# Patient Record
Sex: FEMALE | Race: WHITE
[De-identification: names, ages, dates, MRNs, and addresses within clinical notes are randomized per-mention and may not be internally consistent; named-entity substitution may affect disease eponyms.]

---

## 2020-03-06 ENCOUNTER — HOSPITAL ENCOUNTER (EMERGENCY)
Dept: HOSPITAL 74 - JERFT | Age: 30
Discharge: HOME | End: 2020-03-06
Payer: COMMERCIAL

## 2020-03-06 VITALS — BODY MASS INDEX: 25.9 KG/M2

## 2020-03-06 VITALS — HEART RATE: 107 BPM | SYSTOLIC BLOOD PRESSURE: 131 MMHG | DIASTOLIC BLOOD PRESSURE: 85 MMHG

## 2020-03-06 VITALS — TEMPERATURE: 97.2 F

## 2020-03-06 DIAGNOSIS — J06.9: Primary | ICD-10-CM

## 2020-03-06 DIAGNOSIS — B97.89: ICD-10-CM

## 2020-03-06 NOTE — PDOC
History of Present Illness





- General


Chief Complaint: Cold Symptoms


Stated Complaint: ALLERGIES/ FEVER


Time Seen by Provider: 03/06/20 08:23


History Source: Patient


Exam Limitations: No Limitations





Past History





- Past Medical History


Allergies/Adverse Reactions: 


                                    Allergies











Allergy/AdvReac Type Severity Reaction Status Date / Time


 


No Known Allergies Allergy   Verified 03/06/20 08:17











Home Medications: 


Ambulatory Orders





NK [No Known Home Medication]  11/10/17 








Asthma: No


Cancer: No


Cardiac Disorders: No


COPD: No


Diabetes: No


HTN: No


Seizures: No


Thyroid Disease: No





- Immunization History


Immunization Up to Date: No





- Psycho Social/Smoking Cessation Hx


Smoking History: Never smoked


Information on smoking cessation initiated: No


Hx Alcohol Use: No


Drug/Substance Use Hx: No


Substance Use Type: None


Hx Substance Use Treatment: No





*Physical Exam





- Vital Signs


                                Last Vital Signs











Temp Pulse Resp BP Pulse Ox


 


 97.2 F L  107 H  18   131/85   99 


 


 03/06/20 08:17  03/06/20 08:17  03/06/20 08:17  03/06/20 08:17  03/06/20 08:17














- Physical Exam


General Appearance: No: Apparent Distress


HEENT: positive: Pharynx Normal


Respiratory/Chest: positive: Lungs Clear, Normal Breath Sounds.  negative: 

Respiratory Distress


Cardiovascular: positive: Regular Rhythm, Regular Rate, S1, S2.  negative: 

Murmur


Gastrointestinal/Abdominal: positive: Normal Bowel Sounds, Soft.  negative: 

Tender, Distended, Guarding, Rebound


Neurologic: positive: Alert





ED Treatment Course





- RADIOLOGY


Radiology Studies Ordered: 














 Category Date Time Status


 


 CHEST PA & LAT [RAD] Stat Radiology  03/06/20 08:34 Ordered














Medical Decision Making





- Medical Decision Making











30 y/o F with no sig pmh presents with fever and chills x 3 days. Mentions had 

low grade fever of 99 the past 2 days and today had temp of 101. Took Tylenol in

 the morning. Mentions she went to urgent care 2 days ago and told she had viral

 URI and was prescribed allergy meds, Zyrtec and Flonase. States she also had 

flu swab done 2 days ago which was negative. Denies cough, congestion, 

rhinorrhea, sob, cp, abd pain, n/v, urinary sxs, recent travel, exposure to sick

 contacts.





Likely viral URI


Plan: CXR to r/o PNA





03/06/20 08:39





CXR negative





03/06/20 08:58








Discharge





- Discharge Information


Problems reviewed: Yes


Clinical Impression/Diagnosis: 


 Viral URI





Condition: Stable


Disposition: HOME





- Admission


No





- Additional Discharge Information


Prescription Drug Monitoring Program (I-STOP) results: I-STOP not reviewed





- Follow up/Referral





- Patient Discharge Instructions


Patient Printed Discharge Instructions:  DI for Viral Upper Respiratory 

Infection -- Adult


Additional Instructions: 


Thank you for choosing [facility].  It was a pleasure taking 

care of you.  





You have viral infection


Alternate between Tylenol every 4 and Motrin every 6 hours as needed for fever


Recommend rest and hydration


Follow-up with your doctor in 2 days





Return to the Emergency Department if your symptoms worsen or persist or have 

other concerning symptoms. 





- Post Discharge Activity